# Patient Record
Sex: FEMALE | Employment: UNEMPLOYED | URBAN - METROPOLITAN AREA
[De-identification: names, ages, dates, MRNs, and addresses within clinical notes are randomized per-mention and may not be internally consistent; named-entity substitution may affect disease eponyms.]

---

## 2020-02-25 DIAGNOSIS — O09.522 MULTIGRAVIDA OF ADVANCED MATERNAL AGE IN SECOND TRIMESTER: Primary | ICD-10-CM

## 2020-02-27 ENCOUNTER — INITIAL CONSULT (OUTPATIENT)
Dept: MATERNAL FETAL MEDICINE | Facility: CLINIC | Age: 35
End: 2020-02-27
Payer: OTHER GOVERNMENT

## 2020-02-27 VITALS
DIASTOLIC BLOOD PRESSURE: 60 MMHG | OXYGEN SATURATION: 97 % | HEIGHT: 63 IN | WEIGHT: 211 LBS | RESPIRATION RATE: 20 BRPM | HEART RATE: 86 BPM | SYSTOLIC BLOOD PRESSURE: 108 MMHG | BODY MASS INDEX: 37.39 KG/M2

## 2020-02-27 DIAGNOSIS — O09.522 MULTIGRAVIDA OF ADVANCED MATERNAL AGE IN SECOND TRIMESTER: ICD-10-CM

## 2020-02-27 PROCEDURE — 76811 OB US DETAILED SNGL FETUS: CPT | Mod: S$GLB,,, | Performed by: OBSTETRICS & GYNECOLOGY

## 2020-02-27 PROCEDURE — 99203 OFFICE O/P NEW LOW 30 MIN: CPT | Mod: 25,S$GLB,, | Performed by: OBSTETRICS & GYNECOLOGY

## 2020-02-27 PROCEDURE — 99203 PR OFFICE/OUTPT VISIT, NEW, LEVL III, 30-44 MIN: ICD-10-PCS | Mod: 25,S$GLB,, | Performed by: OBSTETRICS & GYNECOLOGY

## 2020-02-27 PROCEDURE — 76811 PR US, OB FETAL EVAL & EXAM, TRANSABDOM,FIRST GESTATION: ICD-10-PCS | Mod: S$GLB,,, | Performed by: OBSTETRICS & GYNECOLOGY

## 2020-02-27 NOTE — PROGRESS NOTES
"Dot is here for initial MFM consultation, referred by Dr. Ismael Go at [facility] for AMA with negative NIPT    She is feeling fetal movement.    Dot denies vaginal bleeding, loss of fluid, recurrent cramping. She has some migraine headaches.      Vitals:    02/27/20 1154   BP: 108/60   Pulse: 86   Resp: 20   SpO2: 97%   Weight: 95.7 kg (211 lb)   Height: 5' 3" (1.6 m)      BMI:                    37.38 kg/m^2             "

## 2020-02-27 NOTE — PROGRESS NOTES
Initial MFM Consultation  Referring provider: Dr. Go    Indications for referral:  1) Pregnancy at 18 weeks and 5 days gestation (EDC 7-25-20)  2) AMA s/p low risk NIPT  3) History of Preeclampsia      Dear Dr. Go,  Thank you for your kind request for consultation and imaging of your patient Ms. Suarez at the [facility].  She presents due to the above listed indications.  Her history was reviewed and is documented in Epic. She has had a low risk cffDNA screen this pregnancy.  She is allergic to ASA.    Physical Exam  Vital signs are normal.  General: Age appearing female in no apparent distress.  HEENT:  Normal external facial features. No scleral icterus.  CHEST:  Normal respiratory effort and able to speak in sentences without difficulty.  ABDOMEN:  Gravid, soft, nontender  EXTREMITIES: Without clubbing, cyanosis, edema  NEURO: No focal deficits.  MENTAL STATUS: No focal deficits.    ULTRASOUND FINDINGS:  A detailed fetal anatomic survey was performed with some suboptimal structures due to early gestation and fetal positioning. EFW of 229 is appropriate for gestational age.  The fetus is breech.  The placenta is anterior.  Amniotic fluid is normal. There are no fetal structural malformations detected to extent of view, but the RVOT is suboptimally visualized due to fetal positioning.    IMPRESSION:  18 week gestation with low risk aneuploidy screen and a normal but suboptimal ultrasound.    RECOMMENDATIONS/DISCUSSION:  1) We will have her return in 3-4 weeks to complete the anatomic survey.    2) We discussed her 0.003% risk for aneuploidy.  She was made aware of the difference between screening and diagnostic tests for aneuploidy.  She is satisfied with her current risk assessment and declines additional testing.  3) Given her allergy to ASA, there is no intervention recommended for preeclampsia risk reduction.    Thank you for allowing us to participate in her  care.  Please do not hesitate to call with questions.

## 2020-02-27 NOTE — LETTER
February 27, 2020        Ismale Go             [facility]  [address]  [place] [address]  Phone: [phone]  Fax: [phone]   Patient: Dot Suarez   MR Number: [id number]   YOB: 1985   Date of Visit: 2/27/2020       Dear Dr. Go:    Thank you for referring Dot Suarez to me for evaluation. Attached you will find relevant portions of my assessment and plan of care.    If you have questions, please do not hesitate to call me. I look forward to following Dot Suarez along with you.    Sincerely,      Aixa Casillas MD      [facility]

## 2020-03-10 DIAGNOSIS — O09.522 MULTIGRAVIDA OF ADVANCED MATERNAL AGE IN SECOND TRIMESTER: Primary | ICD-10-CM
